# Patient Record
Sex: FEMALE | Race: WHITE | NOT HISPANIC OR LATINO
[De-identification: names, ages, dates, MRNs, and addresses within clinical notes are randomized per-mention and may not be internally consistent; named-entity substitution may affect disease eponyms.]

---

## 2020-01-03 ENCOUNTER — LABORATORY RESULT (OUTPATIENT)
Age: 20
End: 2020-01-03

## 2020-01-03 ENCOUNTER — APPOINTMENT (OUTPATIENT)
Dept: OBGYN | Facility: CLINIC | Age: 20
End: 2020-01-03
Payer: MEDICAID

## 2020-01-03 VITALS
DIASTOLIC BLOOD PRESSURE: 60 MMHG | HEIGHT: 67 IN | BODY MASS INDEX: 22.32 KG/M2 | WEIGHT: 142.19 LBS | SYSTOLIC BLOOD PRESSURE: 100 MMHG

## 2020-01-03 DIAGNOSIS — Z34.91 ENCOUNTER FOR SUPERVISION OF NORMAL PREGNANCY, UNSPECIFIED, FIRST TRIMESTER: ICD-10-CM

## 2020-01-03 PROBLEM — Z00.00 ENCOUNTER FOR PREVENTIVE HEALTH EXAMINATION: Status: ACTIVE | Noted: 2020-01-03

## 2020-01-03 PROCEDURE — 99202 OFFICE O/P NEW SF 15 MIN: CPT

## 2020-01-03 PROCEDURE — 36415 COLL VENOUS BLD VENIPUNCTURE: CPT

## 2020-01-03 NOTE — CHIEF COMPLAINT
[Initial Visit] : initial GYN visit [FreeTextEntry1] : 20yo @ 11+5\par \par LMP 10/13\par THANH 2020\par \par GYNx- regular cycle\par PAP-  wnl\par \par OBx-\par 2018- NVD*1 (Hyperemesis ) 3200g 40weeks \par \par PMH-none\par PSH- Sinus surgery\par \par NKDA\par

## 2020-01-03 NOTE — PROCEDURE
[Intrauterine Pregnancy] : intrauterine pregnancy [Yolk Sac] : yolk sac present [Fetal Heart] : fetal heart present [Current GA by Sonogram: ___ (wks)] : Current GA by Sonogram: [unfilled]Uwks [Date: ___] : EDC: [unfilled] [___ day(s)] : [unfilled] days [WNL] : Transabdominal OB Sonogram WNL

## 2020-01-06 ENCOUNTER — LABORATORY RESULT (OUTPATIENT)
Age: 20
End: 2020-01-06

## 2020-01-06 LAB
ABO + RH PNL BLD: NORMAL
ALBUMIN SERPL ELPH-MCNC: 4.1 G/DL
ALP BLD-CCNC: 45 U/L
ALT SERPL-CCNC: 11 U/L
ANION GAP SERPL CALC-SCNC: 16 MMOL/L
APPEARANCE: ABNORMAL
AST SERPL-CCNC: 13 U/L
BASOPHILS # BLD AUTO: 0.03 K/UL
BASOPHILS NFR BLD AUTO: 0.5 %
BILIRUB SERPL-MCNC: 0.5 MG/DL
BILIRUBIN URINE: NEGATIVE
BLD GP AB SCN SERPL QL: NORMAL
BLOOD URINE: NEGATIVE
BUN SERPL-MCNC: 13 MG/DL
C TRACH RRNA SPEC QL NAA+PROBE: NOT DETECTED
CALCIUM SERPL-MCNC: 9.4 MG/DL
CHLORIDE SERPL-SCNC: 102 MMOL/L
CMV IGG SERPL QL: <0.2 U/ML
CMV IGG SERPL-IMP: NEGATIVE
CMV IGM SERPL QL: <8 AU/ML
CMV IGM SERPL QL: NEGATIVE
CO2 SERPL-SCNC: 21 MMOL/L
COLOR: YELLOW
CREAT SERPL-MCNC: 0.58 MG/DL
EOSINOPHIL # BLD AUTO: 0.09 K/UL
EOSINOPHIL NFR BLD AUTO: 1.5 %
ESTIMATED AVERAGE GLUCOSE: 88 MG/DL
GLUCOSE QUALITATIVE U: NEGATIVE
GLUCOSE SERPL-MCNC: 89 MG/DL
HBA1C MFR BLD HPLC: 4.7 %
HBV SURFACE AG SER QL: NONREACTIVE
HCT VFR BLD CALC: 34.8 %
HCV AB SER QL: NONREACTIVE
HCV S/CO RATIO: 0.11 S/CO
HGB BLD-MCNC: 11.7 G/DL
HIV1+2 AB SPEC QL IA.RAPID: NONREACTIVE
HSV 1+2 IGG SER IA-IMP: NEGATIVE
HSV 1+2 IGG SER IA-IMP: NEGATIVE
HSV1 IGG SER QL: 0.29 INDEX
HSV2 IGG SER QL: 0.13 INDEX
IMM GRANULOCYTES NFR BLD AUTO: 0.2 %
KETONES URINE: NEGATIVE
LEUKOCYTE ESTERASE URINE: ABNORMAL
LYMPHOCYTES # BLD AUTO: 0.99 K/UL
LYMPHOCYTES NFR BLD AUTO: 16.7 %
MAN DIFF?: NORMAL
MCHC RBC-ENTMCNC: 31.5 PG
MCHC RBC-ENTMCNC: 33.6 GM/DL
MCV RBC AUTO: 93.5 FL
MEV IGG FLD QL IA: 119 AU/ML
MEV IGG+IGM SER-IMP: POSITIVE
MONOCYTES # BLD AUTO: 0.25 K/UL
MONOCYTES NFR BLD AUTO: 4.2 %
N GONORRHOEA RRNA SPEC QL NAA+PROBE: NOT DETECTED
NEUTROPHILS # BLD AUTO: 4.56 K/UL
NEUTROPHILS NFR BLD AUTO: 76.9 %
NITRITE URINE: NEGATIVE
PH URINE: 6.5
PLATELET # BLD AUTO: 202 K/UL
POTASSIUM SERPL-SCNC: 3.7 MMOL/L
PROT SERPL-MCNC: 6.4 G/DL
PROTEIN URINE: ABNORMAL
RBC # BLD: 3.72 M/UL
RBC # FLD: 14.6 %
RUBV IGG FLD-ACNC: 1.4 INDEX
RUBV IGG SER-IMP: POSITIVE
SODIUM SERPL-SCNC: 139 MMOL/L
SOURCE AMPLIFICATION: NORMAL
SPECIFIC GRAVITY URINE: >=1.03
T GONDII AB SER-IMP: NEGATIVE
T GONDII AB SER-IMP: NEGATIVE
T GONDII IGG SER QL: <3 IU/ML
T GONDII IGM SER QL: <3 AU/ML
T PALLIDUM AB SER QL IA: NEGATIVE
TSH SERPL-ACNC: 1.05 UIU/ML
UROBILINOGEN URINE: NORMAL
VZV AB TITR SER: POSITIVE
VZV IGG SER IF-ACNC: 557.4 INDEX
WBC # FLD AUTO: 5.93 K/UL

## 2020-01-07 ENCOUNTER — APPOINTMENT (OUTPATIENT)
Dept: ANTEPARTUM | Facility: CLINIC | Age: 20
End: 2020-01-07
Payer: MEDICAID

## 2020-01-07 LAB
MEV IGM SER QL: NEGATIVE
RUBV IGM FLD-ACNC: <20 AU/ML
VZV IGM SER IF-ACNC: <0.91 INDEX

## 2020-01-07 PROCEDURE — 76813 OB US NUCHAL MEAS 1 GEST: CPT

## 2020-01-07 PROCEDURE — 76801 OB US < 14 WKS SINGLE FETUS: CPT

## 2020-01-08 LAB
B19V IGG SER QL IA: 5.2 INDEX
B19V IGG+IGM SER-IMP: NORMAL
B19V IGG+IGM SER-IMP: POSITIVE
B19V IGM FLD-ACNC: 0.1 INDEX
B19V IGM SER-ACNC: NEGATIVE
HGB A MFR BLD: 97.4 %
HGB A2 MFR BLD: 2.6 %
HGB FRACT BLD-IMP: NORMAL
HSV1 IGM SER QL: NORMAL TITER
HSV2 AB FLD-ACNC: NORMAL TITER

## 2020-01-14 LAB
FMR1 GENE MUT ANL BLD/T: NORMAL
HEXOSAMINIDASE B CFR FIB: NEGATIVE

## 2020-01-15 LAB
AR GENE MUT ANL BLD/T: NORMAL
CFTR MUT TESTED BLD/T: NEGATIVE
HEXA GENE MUT ANL BLD/T: NEGATIVE

## 2020-01-22 ENCOUNTER — NON-APPOINTMENT (OUTPATIENT)
Age: 20
End: 2020-01-22

## 2020-01-22 ENCOUNTER — APPOINTMENT (OUTPATIENT)
Dept: OBGYN | Facility: CLINIC | Age: 20
End: 2020-01-22
Payer: MEDICAID

## 2020-01-22 VITALS
SYSTOLIC BLOOD PRESSURE: 101 MMHG | BODY MASS INDEX: 22.6 KG/M2 | WEIGHT: 144 LBS | HEIGHT: 67 IN | DIASTOLIC BLOOD PRESSURE: 62 MMHG

## 2020-01-22 PROCEDURE — 99213 OFFICE O/P EST LOW 20 MIN: CPT | Mod: TH

## 2020-01-23 ENCOUNTER — NON-APPOINTMENT (OUTPATIENT)
Age: 20
End: 2020-01-23

## 2020-02-04 ENCOUNTER — APPOINTMENT (OUTPATIENT)
Dept: ANTEPARTUM | Facility: CLINIC | Age: 20
End: 2020-02-04
Payer: MEDICAID

## 2020-02-04 PROCEDURE — 76805 OB US >/= 14 WKS SNGL FETUS: CPT

## 2020-02-04 PROCEDURE — 76817 TRANSVAGINAL US OBSTETRIC: CPT

## 2020-02-06 ENCOUNTER — NON-APPOINTMENT (OUTPATIENT)
Age: 20
End: 2020-02-06

## 2020-02-06 ENCOUNTER — APPOINTMENT (OUTPATIENT)
Dept: OBGYN | Facility: CLINIC | Age: 20
End: 2020-02-06
Payer: MEDICAID

## 2020-02-06 VITALS
BODY MASS INDEX: 22.53 KG/M2 | SYSTOLIC BLOOD PRESSURE: 100 MMHG | DIASTOLIC BLOOD PRESSURE: 62 MMHG | WEIGHT: 143.56 LBS | HEIGHT: 67 IN

## 2020-02-06 DIAGNOSIS — Z3A.16 16 WEEKS GESTATION OF PREGNANCY: ICD-10-CM

## 2020-02-06 PROCEDURE — 99213 OFFICE O/P EST LOW 20 MIN: CPT | Mod: TH

## 2020-02-11 LAB
1ST TRIMESTER DATA: NORMAL
2ND TRIMESTER DATA: NORMAL
AFP PNL SERPL: NORMAL
AFP SERPL-ACNC: NORMAL
AFP SERPL-ACNC: NORMAL
B-HCG FREE SERPL-MCNC: NORMAL
CLINICAL BIOCHEMIST REVIEW: NORMAL
FREE BETA HCG 1ST TRIMESTER: NORMAL
INHIBIN A SERPL-MCNC: NORMAL
NASAL BONE: PRESENT
NOTES NTD: NORMAL
NT: NORMAL
PAPP-A SERPL-ACNC: NORMAL
U ESTRIOL SERPL-SCNC: NORMAL

## 2020-02-28 ENCOUNTER — NON-APPOINTMENT (OUTPATIENT)
Age: 20
End: 2020-02-28

## 2020-02-28 ENCOUNTER — APPOINTMENT (OUTPATIENT)
Dept: OBGYN | Facility: CLINIC | Age: 20
End: 2020-02-28
Payer: MEDICAID

## 2020-02-28 VITALS
BODY MASS INDEX: 23.27 KG/M2 | DIASTOLIC BLOOD PRESSURE: 60 MMHG | WEIGHT: 148.25 LBS | HEIGHT: 67 IN | SYSTOLIC BLOOD PRESSURE: 100 MMHG

## 2020-02-28 PROCEDURE — 99213 OFFICE O/P EST LOW 20 MIN: CPT | Mod: TH

## 2020-03-10 ENCOUNTER — APPOINTMENT (OUTPATIENT)
Dept: ANTEPARTUM | Facility: CLINIC | Age: 20
End: 2020-03-10
Payer: MEDICAID

## 2020-03-10 PROCEDURE — 76817 TRANSVAGINAL US OBSTETRIC: CPT

## 2020-03-10 PROCEDURE — 76811 OB US DETAILED SNGL FETUS: CPT

## 2020-03-13 ENCOUNTER — APPOINTMENT (OUTPATIENT)
Dept: OBGYN | Facility: CLINIC | Age: 20
End: 2020-03-13
Payer: MEDICAID

## 2020-03-13 VITALS
BODY MASS INDEX: 23.23 KG/M2 | SYSTOLIC BLOOD PRESSURE: 100 MMHG | HEIGHT: 67 IN | DIASTOLIC BLOOD PRESSURE: 60 MMHG | WEIGHT: 148 LBS

## 2020-03-13 PROCEDURE — 99213 OFFICE O/P EST LOW 20 MIN: CPT | Mod: TH

## 2020-04-22 ENCOUNTER — APPOINTMENT (OUTPATIENT)
Dept: OBGYN | Facility: CLINIC | Age: 20
End: 2020-04-22
Payer: MEDICAID

## 2020-04-22 VITALS
DIASTOLIC BLOOD PRESSURE: 60 MMHG | BODY MASS INDEX: 23.54 KG/M2 | SYSTOLIC BLOOD PRESSURE: 100 MMHG | WEIGHT: 150 LBS | HEIGHT: 67 IN

## 2020-04-22 DIAGNOSIS — Z34.90 ENCOUNTER FOR SUPERVISION OF NORMAL PREGNANCY, UNSPECIFIED, UNSPECIFIED TRIMESTER: ICD-10-CM

## 2020-04-22 PROCEDURE — 99213 OFFICE O/P EST LOW 20 MIN: CPT | Mod: TH

## 2020-04-23 LAB
BASOPHILS # BLD AUTO: 0.01 K/UL
BASOPHILS NFR BLD AUTO: 0.1 %
EOSINOPHIL # BLD AUTO: 0.1 K/UL
EOSINOPHIL NFR BLD AUTO: 1.4 %
GLUCOSE 1H P 50 G GLC PO SERPL-MCNC: 74 MG/DL
HCT VFR BLD CALC: 34.3 %
HGB BLD-MCNC: 10.8 G/DL
IMM GRANULOCYTES NFR BLD AUTO: 0.1 %
LYMPHOCYTES # BLD AUTO: 1.2 K/UL
LYMPHOCYTES NFR BLD AUTO: 16.2 %
MAN DIFF?: NORMAL
MCHC RBC-ENTMCNC: 31 PG
MCHC RBC-ENTMCNC: 31.5 GM/DL
MCV RBC AUTO: 98.6 FL
MONOCYTES # BLD AUTO: 0.43 K/UL
MONOCYTES NFR BLD AUTO: 5.8 %
NEUTROPHILS # BLD AUTO: 5.65 K/UL
NEUTROPHILS NFR BLD AUTO: 76.4 %
PLATELET # BLD AUTO: 250 K/UL
RBC # BLD: 3.48 M/UL
RBC # FLD: 12.4 %
T PALLIDUM AB SER QL IA: NEGATIVE
WBC # FLD AUTO: 7.4 K/UL

## 2020-04-27 LAB — BACTERIA UR CULT: ABNORMAL

## 2020-05-19 ENCOUNTER — APPOINTMENT (OUTPATIENT)
Dept: ANTEPARTUM | Facility: CLINIC | Age: 20
End: 2020-05-19
Payer: MEDICAID

## 2020-05-19 PROCEDURE — 76816 OB US FOLLOW-UP PER FETUS: CPT

## 2020-05-19 PROCEDURE — 76819 FETAL BIOPHYS PROFIL W/O NST: CPT

## 2020-05-21 ENCOUNTER — APPOINTMENT (OUTPATIENT)
Dept: OBGYN | Facility: CLINIC | Age: 20
End: 2020-05-21
Payer: MEDICAID

## 2020-05-21 ENCOUNTER — NON-APPOINTMENT (OUTPATIENT)
Age: 20
End: 2020-05-21

## 2020-05-21 VITALS — DIASTOLIC BLOOD PRESSURE: 60 MMHG | WEIGHT: 152 LBS | SYSTOLIC BLOOD PRESSURE: 90 MMHG

## 2020-05-21 PROCEDURE — 99213 OFFICE O/P EST LOW 20 MIN: CPT | Mod: TH

## 2020-06-04 ENCOUNTER — APPOINTMENT (OUTPATIENT)
Dept: OBGYN | Facility: CLINIC | Age: 20
End: 2020-06-04
Payer: MEDICAID

## 2020-06-04 ENCOUNTER — NON-APPOINTMENT (OUTPATIENT)
Age: 20
End: 2020-06-04

## 2020-06-04 VITALS — DIASTOLIC BLOOD PRESSURE: 70 MMHG | SYSTOLIC BLOOD PRESSURE: 100 MMHG | WEIGHT: 154 LBS

## 2020-06-04 PROCEDURE — 99213 OFFICE O/P EST LOW 20 MIN: CPT | Mod: TH

## 2020-06-23 ENCOUNTER — APPOINTMENT (OUTPATIENT)
Dept: ANTEPARTUM | Facility: CLINIC | Age: 20
End: 2020-06-23
Payer: MEDICAID

## 2020-06-23 PROCEDURE — 76819 FETAL BIOPHYS PROFIL W/O NST: CPT

## 2020-06-26 ENCOUNTER — NON-APPOINTMENT (OUTPATIENT)
Age: 20
End: 2020-06-26

## 2020-06-26 ENCOUNTER — APPOINTMENT (OUTPATIENT)
Dept: OBGYN | Facility: CLINIC | Age: 20
End: 2020-06-26
Payer: MEDICAID

## 2020-06-26 VITALS — WEIGHT: 156 LBS | SYSTOLIC BLOOD PRESSURE: 100 MMHG | DIASTOLIC BLOOD PRESSURE: 60 MMHG

## 2020-06-26 PROCEDURE — 99213 OFFICE O/P EST LOW 20 MIN: CPT | Mod: TH

## 2020-06-30 LAB
GP B STREP DNA SPEC QL NAA+PROBE: NORMAL
GP B STREP DNA SPEC QL NAA+PROBE: NOT DETECTED
SOURCE GBS: NORMAL

## 2020-07-02 ENCOUNTER — NON-APPOINTMENT (OUTPATIENT)
Age: 20
End: 2020-07-02

## 2020-07-02 ENCOUNTER — APPOINTMENT (OUTPATIENT)
Dept: OBGYN | Facility: CLINIC | Age: 20
End: 2020-07-02
Payer: MEDICAID

## 2020-07-02 VITALS — DIASTOLIC BLOOD PRESSURE: 70 MMHG | WEIGHT: 156 LBS | SYSTOLIC BLOOD PRESSURE: 100 MMHG

## 2020-07-02 PROCEDURE — 99213 OFFICE O/P EST LOW 20 MIN: CPT | Mod: TH

## 2020-07-10 ENCOUNTER — APPOINTMENT (OUTPATIENT)
Dept: OBGYN | Facility: CLINIC | Age: 20
End: 2020-07-10
Payer: MEDICAID

## 2020-07-10 ENCOUNTER — NON-APPOINTMENT (OUTPATIENT)
Age: 20
End: 2020-07-10

## 2020-07-10 VITALS
HEIGHT: 67 IN | SYSTOLIC BLOOD PRESSURE: 100 MMHG | WEIGHT: 157 LBS | DIASTOLIC BLOOD PRESSURE: 70 MMHG | BODY MASS INDEX: 24.64 KG/M2

## 2020-07-10 PROCEDURE — 99213 OFFICE O/P EST LOW 20 MIN: CPT | Mod: TH

## 2020-07-17 ENCOUNTER — APPOINTMENT (OUTPATIENT)
Dept: OBGYN | Facility: CLINIC | Age: 20
End: 2020-07-17
Payer: MEDICAID

## 2020-07-17 ENCOUNTER — NON-APPOINTMENT (OUTPATIENT)
Age: 20
End: 2020-07-17

## 2020-07-17 ENCOUNTER — INPATIENT (INPATIENT)
Facility: HOSPITAL | Age: 20
LOS: 1 days | Discharge: HOME | End: 2020-07-19
Attending: GENERAL ACUTE CARE HOSPITAL | Admitting: GENERAL ACUTE CARE HOSPITAL
Payer: MEDICAID

## 2020-07-17 VITALS — DIASTOLIC BLOOD PRESSURE: 60 MMHG | WEIGHT: 157 LBS | SYSTOLIC BLOOD PRESSURE: 100 MMHG

## 2020-07-17 VITALS — HEIGHT: 67 IN | WEIGHT: 157.41 LBS

## 2020-07-17 DIAGNOSIS — O26.899 OTHER SPECIFIED PREGNANCY RELATED CONDITIONS, UNSPECIFIED TRIMESTER: ICD-10-CM

## 2020-07-17 DIAGNOSIS — Z3A.00 WEEKS OF GESTATION OF PREGNANCY NOT SPECIFIED: ICD-10-CM

## 2020-07-17 LAB
BASOPHILS # BLD AUTO: 0.03 K/UL — SIGNIFICANT CHANGE UP (ref 0–0.2)
BASOPHILS NFR BLD AUTO: 0.2 % — SIGNIFICANT CHANGE UP (ref 0–2)
EOSINOPHIL # BLD AUTO: 0.05 K/UL — SIGNIFICANT CHANGE UP (ref 0–0.5)
EOSINOPHIL NFR BLD AUTO: 0.4 % — SIGNIFICANT CHANGE UP (ref 0–6)
HCT VFR BLD CALC: 29.6 % — LOW (ref 34.5–45)
HGB BLD-MCNC: 9.4 G/DL — LOW (ref 11.5–15.5)
IMM GRANULOCYTES NFR BLD AUTO: 0.6 % — SIGNIFICANT CHANGE UP (ref 0–1.5)
LYMPHOCYTES # BLD AUTO: 1.4 K/UL — SIGNIFICANT CHANGE UP (ref 1–3.3)
LYMPHOCYTES # BLD AUTO: 11.2 % — LOW (ref 13–44)
MCHC RBC-ENTMCNC: 26.6 PG — LOW (ref 27–34)
MCHC RBC-ENTMCNC: 31.8 GM/DL — LOW (ref 32–36)
MCV RBC AUTO: 83.6 FL — SIGNIFICANT CHANGE UP (ref 80–100)
MONOCYTES # BLD AUTO: 0.51 K/UL — SIGNIFICANT CHANGE UP (ref 0–0.9)
MONOCYTES NFR BLD AUTO: 4.1 % — SIGNIFICANT CHANGE UP (ref 2–14)
NEUTROPHILS # BLD AUTO: 10.49 K/UL — HIGH (ref 1.8–7.4)
NEUTROPHILS NFR BLD AUTO: 83.5 % — HIGH (ref 43–77)
NRBC # BLD: 0 /100 WBCS — SIGNIFICANT CHANGE UP (ref 0–0)
PLATELET # BLD AUTO: 243 K/UL — SIGNIFICANT CHANGE UP (ref 150–400)
RBC # BLD: 3.54 M/UL — LOW (ref 3.8–5.2)
RBC # FLD: 13.5 % — SIGNIFICANT CHANGE UP (ref 10.3–14.5)
SARS-COV-2 RNA SPEC QL NAA+PROBE: SIGNIFICANT CHANGE UP
WBC # BLD: 12.55 K/UL — HIGH (ref 3.8–10.5)
WBC # FLD AUTO: 12.55 K/UL — HIGH (ref 3.8–10.5)

## 2020-07-17 PROCEDURE — 59409 OBSTETRICAL CARE: CPT | Mod: U9

## 2020-07-17 PROCEDURE — 99213 OFFICE O/P EST LOW 20 MIN: CPT | Mod: TH

## 2020-07-17 RX ORDER — OXYTOCIN 10 UNIT/ML
333.33 VIAL (ML) INJECTION
Qty: 20 | Refills: 0 | Status: DISCONTINUED | OUTPATIENT
Start: 2020-07-17 | End: 2020-07-18

## 2020-07-17 RX ORDER — OXYTOCIN 10 UNIT/ML
2 VIAL (ML) INJECTION
Qty: 30 | Refills: 0 | Status: DISCONTINUED | OUTPATIENT
Start: 2020-07-17 | End: 2020-07-18

## 2020-07-17 RX ORDER — FENTANYL/BUPIVACAINE/NS/PF 2MCG/ML-.1
250 PLASTIC BAG, INJECTION (ML) INJECTION
Refills: 0 | Status: DISCONTINUED | OUTPATIENT
Start: 2020-07-17 | End: 2020-07-18

## 2020-07-17 RX ORDER — SODIUM CHLORIDE 9 MG/ML
1000 INJECTION, SOLUTION INTRAVENOUS
Refills: 0 | Status: DISCONTINUED | OUTPATIENT
Start: 2020-07-17 | End: 2020-07-18

## 2020-07-17 RX ORDER — CITRIC ACID/SODIUM CITRATE 300-500 MG
15 SOLUTION, ORAL ORAL ONCE
Refills: 0 | Status: DISCONTINUED | OUTPATIENT
Start: 2020-07-17 | End: 2020-07-18

## 2020-07-17 RX ADMIN — SODIUM CHLORIDE 125 MILLILITER(S): 9 INJECTION, SOLUTION INTRAVENOUS at 18:23

## 2020-07-17 RX ADMIN — Medication 2 MILLIUNIT(S)/MIN: at 20:43

## 2020-07-17 RX ADMIN — SODIUM CHLORIDE 125 MILLILITER(S): 9 INJECTION, SOLUTION INTRAVENOUS at 18:55

## 2020-07-17 RX ADMIN — Medication 250 MILLILITER(S): at 19:17

## 2020-07-18 ENCOUNTER — TRANSCRIPTION ENCOUNTER (OUTPATIENT)
Age: 20
End: 2020-07-18

## 2020-07-18 VITALS
SYSTOLIC BLOOD PRESSURE: 99 MMHG | OXYGEN SATURATION: 99 % | HEART RATE: 68 BPM | DIASTOLIC BLOOD PRESSURE: 66 MMHG | RESPIRATION RATE: 18 BRPM | TEMPERATURE: 98 F

## 2020-07-18 LAB — T PALLIDUM AB TITR SER: NEGATIVE — SIGNIFICANT CHANGE UP

## 2020-07-18 PROCEDURE — 86901 BLOOD TYPING SEROLOGIC RH(D): CPT

## 2020-07-18 PROCEDURE — 87635 SARS-COV-2 COVID-19 AMP PRB: CPT

## 2020-07-18 PROCEDURE — 85025 COMPLETE CBC W/AUTO DIFF WBC: CPT

## 2020-07-18 PROCEDURE — 36415 COLL VENOUS BLD VENIPUNCTURE: CPT

## 2020-07-18 PROCEDURE — 86780 TREPONEMA PALLIDUM: CPT

## 2020-07-18 PROCEDURE — 86769 SARS-COV-2 COVID-19 ANTIBODY: CPT

## 2020-07-18 PROCEDURE — 99214 OFFICE O/P EST MOD 30 MIN: CPT

## 2020-07-18 PROCEDURE — 86850 RBC ANTIBODY SCREEN: CPT

## 2020-07-18 RX ORDER — KETOROLAC TROMETHAMINE 30 MG/ML
30 SYRINGE (ML) INJECTION ONCE
Refills: 0 | Status: DISCONTINUED | OUTPATIENT
Start: 2020-07-18 | End: 2020-07-18

## 2020-07-18 RX ORDER — ACETAMINOPHEN 500 MG
3 TABLET ORAL
Qty: 30 | Refills: 0
Start: 2020-07-18

## 2020-07-18 RX ORDER — OXYCODONE HYDROCHLORIDE 5 MG/1
5 TABLET ORAL ONCE
Refills: 0 | Status: DISCONTINUED | OUTPATIENT
Start: 2020-07-18 | End: 2020-07-19

## 2020-07-18 RX ORDER — SIMETHICONE 80 MG/1
80 TABLET, CHEWABLE ORAL EVERY 4 HOURS
Refills: 0 | Status: DISCONTINUED | OUTPATIENT
Start: 2020-07-18 | End: 2020-07-19

## 2020-07-18 RX ORDER — AER TRAVELER 0.5 G/1
1 SOLUTION RECTAL; TOPICAL EVERY 4 HOURS
Refills: 0 | Status: DISCONTINUED | OUTPATIENT
Start: 2020-07-18 | End: 2020-07-19

## 2020-07-18 RX ORDER — DIPHENHYDRAMINE HCL 50 MG
25 CAPSULE ORAL EVERY 6 HOURS
Refills: 0 | Status: DISCONTINUED | OUTPATIENT
Start: 2020-07-18 | End: 2020-07-19

## 2020-07-18 RX ORDER — OXYCODONE HYDROCHLORIDE 5 MG/1
5 TABLET ORAL
Refills: 0 | Status: DISCONTINUED | OUTPATIENT
Start: 2020-07-18 | End: 2020-07-19

## 2020-07-18 RX ORDER — OXYTOCIN 10 UNIT/ML
333.33 VIAL (ML) INJECTION
Qty: 20 | Refills: 0 | Status: DISCONTINUED | OUTPATIENT
Start: 2020-07-18 | End: 2020-07-19

## 2020-07-18 RX ORDER — ACETAMINOPHEN 500 MG
975 TABLET ORAL EVERY 6 HOURS
Refills: 0 | Status: DISCONTINUED | OUTPATIENT
Start: 2020-07-18 | End: 2020-07-19

## 2020-07-18 RX ORDER — DIBUCAINE 1 %
1 OINTMENT (GRAM) RECTAL EVERY 6 HOURS
Refills: 0 | Status: DISCONTINUED | OUTPATIENT
Start: 2020-07-18 | End: 2020-07-19

## 2020-07-18 RX ORDER — LANOLIN
1 OINTMENT (GRAM) TOPICAL EVERY 6 HOURS
Refills: 0 | Status: DISCONTINUED | OUTPATIENT
Start: 2020-07-18 | End: 2020-07-19

## 2020-07-18 RX ORDER — PRAMOXINE HYDROCHLORIDE 150 MG/15G
1 AEROSOL, FOAM RECTAL EVERY 4 HOURS
Refills: 0 | Status: DISCONTINUED | OUTPATIENT
Start: 2020-07-18 | End: 2020-07-19

## 2020-07-18 RX ORDER — SODIUM CHLORIDE 9 MG/ML
3 INJECTION INTRAMUSCULAR; INTRAVENOUS; SUBCUTANEOUS EVERY 8 HOURS
Refills: 0 | Status: DISCONTINUED | OUTPATIENT
Start: 2020-07-18 | End: 2020-07-19

## 2020-07-18 RX ORDER — BENZOCAINE 10 %
1 GEL (GRAM) MUCOUS MEMBRANE EVERY 6 HOURS
Refills: 0 | Status: DISCONTINUED | OUTPATIENT
Start: 2020-07-18 | End: 2020-07-19

## 2020-07-18 RX ORDER — IBUPROFEN 200 MG
600 TABLET ORAL EVERY 6 HOURS
Refills: 0 | Status: DISCONTINUED | OUTPATIENT
Start: 2020-07-18 | End: 2020-07-19

## 2020-07-18 RX ORDER — IBUPROFEN 200 MG
600 TABLET ORAL EVERY 6 HOURS
Refills: 0 | Status: COMPLETED | OUTPATIENT
Start: 2020-07-18 | End: 2021-06-16

## 2020-07-18 RX ORDER — TETANUS TOXOID, REDUCED DIPHTHERIA TOXOID AND ACELLULAR PERTUSSIS VACCINE, ADSORBED 5; 2.5; 8; 8; 2.5 [IU]/.5ML; [IU]/.5ML; UG/.5ML; UG/.5ML; UG/.5ML
0.5 SUSPENSION INTRAMUSCULAR ONCE
Refills: 0 | Status: DISCONTINUED | OUTPATIENT
Start: 2020-07-18 | End: 2020-07-19

## 2020-07-18 RX ORDER — IBUPROFEN 200 MG
1 TABLET ORAL
Qty: 30 | Refills: 0
Start: 2020-07-18

## 2020-07-18 RX ORDER — HYDROCORTISONE 1 %
1 OINTMENT (GRAM) TOPICAL EVERY 6 HOURS
Refills: 0 | Status: DISCONTINUED | OUTPATIENT
Start: 2020-07-18 | End: 2020-07-19

## 2020-07-18 RX ORDER — MAGNESIUM HYDROXIDE 400 MG/1
30 TABLET, CHEWABLE ORAL
Refills: 0 | Status: DISCONTINUED | OUTPATIENT
Start: 2020-07-18 | End: 2020-07-19

## 2020-07-18 RX ADMIN — Medication 975 MILLIGRAM(S): at 10:00

## 2020-07-18 RX ADMIN — Medication 1000 MILLIUNIT(S)/MIN: at 00:12

## 2020-07-18 RX ADMIN — Medication 975 MILLIGRAM(S): at 21:06

## 2020-07-18 RX ADMIN — Medication 975 MILLIGRAM(S): at 22:06

## 2020-07-18 RX ADMIN — Medication 30 MILLIGRAM(S): at 00:40

## 2020-07-18 RX ADMIN — Medication 30 MILLIGRAM(S): at 01:14

## 2020-07-18 RX ADMIN — Medication 975 MILLIGRAM(S): at 16:04

## 2020-07-18 RX ADMIN — SODIUM CHLORIDE 3 MILLILITER(S): 9 INJECTION INTRAMUSCULAR; INTRAVENOUS; SUBCUTANEOUS at 06:00

## 2020-07-18 RX ADMIN — SODIUM CHLORIDE 3 MILLILITER(S): 9 INJECTION INTRAMUSCULAR; INTRAVENOUS; SUBCUTANEOUS at 14:00

## 2020-07-18 RX ADMIN — Medication 600 MILLIGRAM(S): at 18:28

## 2020-07-18 RX ADMIN — Medication 1 TABLET(S): at 12:12

## 2020-07-18 RX ADMIN — Medication 600 MILLIGRAM(S): at 12:13

## 2020-07-18 RX ADMIN — Medication 600 MILLIGRAM(S): at 06:50

## 2020-07-18 RX ADMIN — Medication 975 MILLIGRAM(S): at 09:28

## 2020-07-18 NOTE — PROGRESS NOTE ADULT - ASSESSMENT
A/P yo 20y  s/p , PP#1 , stable, meeting postpartum milestones  1. Pain: controlled on tylenol and motrin  2. GI: Tolerating regular diet  3. :  Voiding spontaneously without pain or difficulty  4. DVT prophylaxis: ambulating well, no SCDs needed at this time   5. Dispo: PP#1 unless otherwise specified

## 2020-07-18 NOTE — PROGRESS NOTE ADULT - SUBJECTIVE AND OBJECTIVE BOX
Patient evaluated at bedside.  No acute events overnight.    She reports pain is well controlled with medications.     She has been ambulating without assistance and is voiding spontaneously. Reports decrease in vaginal bleeding and denies clots.     She denies HA, dizziness, chest pain, palpitations, shortness of breath, n/v, heavy vaginal bleeding or perineal discomfort.    Physical Exam:  Vital Signs Last 24 Hrs  T(C): 36.8 (18 Jul 2020 21:50), Max: 36.9 (18 Jul 2020 14:00)  T(F): 98.2 (18 Jul 2020 21:50), Max: 98.4 (18 Jul 2020 14:00)  HR: 68 (18 Jul 2020 21:50) (52 - 76)  BP: 99/66 (18 Jul 2020 21:50) (93/61 - 108/72)  BP(mean): --  RR: 18 (18 Jul 2020 21:50) (14 - 20)  SpO2: 99% (18 Jul 2020 21:50) (97% - 100%)    GA: NAD, A+0 x 3  Abd: + BS, soft, appropriately tender, nondistended, no rebound or guarding, uterus firm at midline  : lochia WNL  Extremities: no edema or calf tenderness                          9.4    12.55 )-----------( 243      ( 17 Jul 2020 18:42 )             29.6

## 2020-07-18 NOTE — DISCHARGE NOTE OB - PATIENT PORTAL LINK FT
You can access the FollowMyHealth Patient Portal offered by [facility] by registering at the following website: [email or web address]. By joining [employer]’s FollowMyHealth portal, you will also be able to view your health information using other applications (apps) compatible with our system.

## 2020-07-18 NOTE — CHART NOTE - NSCHARTNOTEFT_GEN_A_CORE
Patient evaluated at bedside for HR 50s postpartum for the last few hours. Patient completely asymptomatic, BP 100s/60s, vaginal bleeding wnl. STAT EKG done, verbal read per cardiology sinus bradycardia, no acute intervention indicated.    d/w Dr Luna

## 2020-07-18 NOTE — DISCHARGE NOTE OB - CARE PROVIDER_API CALL
Kayden James)  OBGYN  [address], [facility] [address]  [place] [id number]  Phone: [phone]  Fax: [phone]  Follow Up Time:

## 2020-07-18 NOTE — DISCHARGE NOTE OB - PLAN OF CARE
Happy mother and baby Please follow-up with your OB doctor within 4 weeks. You can resume a regular diet at home and you should continue your prenatal vitamins as directed. Please place nothing in the vagina for 6 weeks (no tampons, no sex, no douching, no tub baths, no swimming pools, etc). If you have severe headaches and/or vision changes, heavy bleeding, chest pain, please call your provider or go to the nearest ED. Please call your OB with any signs of symptoms of infection including fever > 100.4 degrees, severe pain, malodorous vaginal discharge or heavy bleeding requiring more than 1-2 pads/hour. You can take Motrin 600mg orally every 6 hours for pain as needed.

## 2020-07-19 LAB
SARS-COV-2 IGG SERPL QL IA: NEGATIVE — SIGNIFICANT CHANGE UP
SARS-COV-2 IGM SERPL IA-ACNC: <0.1 INDEX — SIGNIFICANT CHANGE UP

## 2020-07-22 DIAGNOSIS — O32.4XX0 MATERNAL CARE FOR HIGH HEAD AT TERM, NOT APPLICABLE OR UNSPECIFIED: ICD-10-CM

## 2020-07-22 DIAGNOSIS — Z34.83 ENCOUNTER FOR SUPERVISION OF OTHER NORMAL PREGNANCY, THIRD TRIMESTER: ICD-10-CM

## 2020-07-22 DIAGNOSIS — Z3A.40 40 WEEKS GESTATION OF PREGNANCY: ICD-10-CM

## 2020-09-02 ENCOUNTER — APPOINTMENT (OUTPATIENT)
Dept: OBGYN | Facility: CLINIC | Age: 20
End: 2020-09-02
Payer: SELF-PAY

## 2020-09-02 RX ORDER — NORETHINDRONE 0.35 MG/1
0.35 TABLET ORAL DAILY
Qty: 1 | Refills: 6 | Status: ACTIVE | COMMUNITY
Start: 2020-09-02 | End: 1900-01-01

## 2020-09-02 NOTE — HISTORY OF PRESENT ILLNESS
[Postpartum Follow Up] : postpartum follow up [Delivery Date: ___] : on [unfilled] [Female] : Delivery History: baby girl [] : delivered by vaginal delivery [Complications:___] : no complications [Breastfeeding] : not currently nursing [Erythema] : not erythematous [S/Sx PP Depression] : no signs/symptoms of postpartum depression [Mild] : mild vaginal bleeding [Back to Normal] : is back to normal in size [Normal] : the vagina was normal [Cervix Sample Taken] : cervical sample not taken for a Pap smear [Doing Well] : is doing well [Not Done] : Examination of breasts not done [No Sign of Infection] : is showing no signs of infection [Excellent Pain Control] : has excellent pain control [None] : None

## 2024-01-01 NOTE — PATIENT PROFILE OB - DELIVERY INFORMATION-PLACENTA STATUS, BABY A
Patient will change her antihistamine and was instructed to continue intranasal steroid.  She received Kenalog in office today   intact/delivered spontaneously
